# Patient Record
Sex: FEMALE | Race: WHITE
[De-identification: names, ages, dates, MRNs, and addresses within clinical notes are randomized per-mention and may not be internally consistent; named-entity substitution may affect disease eponyms.]

---

## 2018-01-08 ENCOUNTER — HOSPITAL ENCOUNTER (INPATIENT)
Dept: HOSPITAL 45 - C.EDB | Age: 38
LOS: 1 days | Discharge: HOME | DRG: 301 | End: 2018-01-09
Attending: HOSPITALIST | Admitting: HOSPITALIST
Payer: COMMERCIAL

## 2018-01-08 VITALS
WEIGHT: 183.2 LBS | BODY MASS INDEX: 29.44 KG/M2 | HEIGHT: 66 IN | WEIGHT: 183.2 LBS | BODY MASS INDEX: 29.44 KG/M2 | HEIGHT: 66 IN

## 2018-01-08 DIAGNOSIS — I77.74: Primary | ICD-10-CM

## 2018-01-08 LAB
BUN SERPL-MCNC: 12 MG/DL (ref 7–18)
CALCIUM SERPL-MCNC: 9.1 MG/DL (ref 8.5–10.1)
CO2 SERPL-SCNC: 29 MMOL/L (ref 21–32)
CREAT SERPL-MCNC: 0.84 MG/DL (ref 0.6–1.2)
EOSINOPHIL NFR BLD AUTO: 242 K/UL (ref 130–400)
GLUCOSE SERPL-MCNC: 97 MG/DL (ref 70–99)
HCT VFR BLD CALC: 38.8 % (ref 37–47)
HGB BLD-MCNC: 13.2 G/DL (ref 12–16)
MCH RBC QN AUTO: 30.2 PG (ref 25–34)
MCHC RBC AUTO-ENTMCNC: 34 G/DL (ref 32–36)
MCV RBC AUTO: 88.8 FL (ref 80–100)
PMV BLD AUTO: 9.4 FL (ref 7.4–10.4)
POTASSIUM SERPL-SCNC: 3.8 MMOL/L (ref 3.5–5.1)
RED CELL DISTRIBUTION WIDTH CV: 13 % (ref 11.5–14.5)
RED CELL DISTRIBUTION WIDTH SD: 42.2 FL (ref 36.4–46.3)
SODIUM SERPL-SCNC: 135 MMOL/L (ref 136–145)
WBC # BLD AUTO: 8.03 K/UL (ref 4.8–10.8)

## 2018-01-08 NOTE — DIAGNOSTIC IMAGING REPORT
CT ANGIOGRAM OF THE BRAIN COMBO; CT ANGIOGRAM OF THE NECK



CLINICAL HISTORY: Headache.



COMPARISON STUDY:  No priors.



TECHNIQUE: Unenhanced axial CT scan of the brain is performed. Subsequently,

following the IV administration of 94 of Optiray 320, CT angiogram of the head

and neck was performed from the aortic arch to the vertex. Images are reviewed

in the axial, sagittal, and coronal planes. 3-D MIPS images are created and

assessed. IV contrast was administered without complication. All measurements

were calculated based on NASCET criteria.  A dose lowering technique was

utilized adhering to the principles of ALARA.



CT DOSE: 1072.78 mGy.cm



FINDINGS:



Brain parenchyma: The brain parenchyma is normal in appearance. There is no

hemorrhage, mass effect, or evidence of acute territorial ischemia by CT

criteria. There is no evidence of enhancing mass lesion on the angiogram phase

images. The ventricles, sulci, and cisterns are normal in configuration.

Gray-white matter differentiation is preserved. No extra-axial fluid collection

is seen.



Thoracic aorta: Visualized portions of the thoracic aorta are normal in caliber.

The aortic arch demonstrates standard 3-vessel anatomy.



Right carotid arterial system: The right common carotid artery is widely patent,

as are the right internal and external carotid arteries.



Left carotid arterial system: The left common carotid artery is widely patent,

as are the left internal and external carotid arteries.



Vertebral arteries: The vertebral arteries are patent bilaterally and

codominant. There is a focal dissection identified in the right vertebral artery

in the neck at the level of C4-C5. This is best seen on axial image #126. Focal

dissection is also identified in the mid to distal left vertebral artery in the

neck at the level of C3-C4. This is best seen on axial image #142.



Subclavian arteries: Widely patent bilaterally.



Intracranial vasculature: There is fetal origin of the left posterior cerebral

artery. There is a large right posterior communicating artery. The internal

carotid arteries are patent at the skull base, as are the anterior and middle

cerebral arteries bilaterally. The vertebrobasilar system and posterior cerebral

arteries are widely patent. The vertebral arteries are codominant. There is no

aneurysm, high-grade stenosis, or focal vessel cut off seen throughout the

intracranial circulation.



Jugular veins: Widely patent bilaterally.



Dural sinuses: Patent.



Lung apices: Partially visualized upper lobe lung parenchyma appears clear.



Soft tissues: The visualized pharyngeal soft tissues are normal in appearance

noting angiographic phase technique. The oropharyngeal airway appears widely

patent. The salivary and thyroid glands are normal in appearance. No cervical

lymphadenopathy is seen.



Skeletal structures: The calvarium appears intact. The cervical spine is within

normal limits.



Sinuses and mastoids: Trace mucosal thickening is seen in the maxillary antra.

The remaining paranasal sinuses are clear. The mastoid air cells are well

pneumatized.





IMPRESSION:



1. There is no hemorrhage, mass effect, or evidence of acute territorial

ischemia by CT criteria. 



2. Unremarkable CT angiogram of the brain.



3. There are focal dissections identified within both vertebral arteries in the

neck as above. The vessels remain patent.



4. The carotid arteries are widely patent bilaterally.







Electronically signed by:  George Brice M.D.

1/8/2018 10:12 PM



Dictated Date/Time:  1/8/2018 10:00 PM

## 2018-01-09 VITALS
SYSTOLIC BLOOD PRESSURE: 127 MMHG | TEMPERATURE: 98.42 F | OXYGEN SATURATION: 98 % | HEART RATE: 80 BPM | DIASTOLIC BLOOD PRESSURE: 86 MMHG

## 2018-01-09 VITALS
TEMPERATURE: 98.42 F | HEART RATE: 67 BPM | DIASTOLIC BLOOD PRESSURE: 87 MMHG | SYSTOLIC BLOOD PRESSURE: 143 MMHG | OXYGEN SATURATION: 96 %

## 2018-01-09 VITALS
TEMPERATURE: 98.42 F | DIASTOLIC BLOOD PRESSURE: 89 MMHG | OXYGEN SATURATION: 97 % | SYSTOLIC BLOOD PRESSURE: 143 MMHG | HEART RATE: 69 BPM

## 2018-01-09 VITALS
OXYGEN SATURATION: 98 % | SYSTOLIC BLOOD PRESSURE: 127 MMHG | DIASTOLIC BLOOD PRESSURE: 86 MMHG | TEMPERATURE: 98.42 F | HEART RATE: 80 BPM

## 2018-01-09 VITALS
SYSTOLIC BLOOD PRESSURE: 149 MMHG | HEART RATE: 72 BPM | DIASTOLIC BLOOD PRESSURE: 96 MMHG | TEMPERATURE: 98.06 F | OXYGEN SATURATION: 96 %

## 2018-01-09 VITALS — OXYGEN SATURATION: 96 %

## 2018-01-09 LAB
ALBUMIN SERPL-MCNC: 3.9 GM/DL (ref 3.4–5)
ALP SERPL-CCNC: 68 U/L (ref 45–117)
ALT SERPL-CCNC: 27 U/L (ref 12–78)
AST SERPL-CCNC: 20 U/L (ref 15–37)
BASOPHILS # BLD: 0.03 K/UL (ref 0–0.2)
BASOPHILS NFR BLD: 0.3 %
BUN SERPL-MCNC: 9 MG/DL (ref 7–18)
CALCIUM SERPL-MCNC: 8.6 MG/DL (ref 8.5–10.1)
CO2 SERPL-SCNC: 25 MMOL/L (ref 21–32)
CREAT SERPL-MCNC: 0.58 MG/DL (ref 0.6–1.2)
EOS ABS #: 0.29 K/UL (ref 0–0.5)
EOSINOPHIL NFR BLD AUTO: 228 K/UL (ref 130–400)
GLUCOSE SERPL-MCNC: 92 MG/DL (ref 70–99)
HCT VFR BLD CALC: 35.8 % (ref 37–47)
HGB BLD-MCNC: 12.1 G/DL (ref 12–16)
IG#: 0.02 K/UL (ref 0–0.02)
IMM GRANULOCYTES NFR BLD AUTO: 30.4 %
INR PPP: 0.9 (ref 0.9–1.1)
LYMPHOCYTES # BLD: 2.85 K/UL (ref 1.2–3.4)
MCH RBC QN AUTO: 30 PG (ref 25–34)
MCHC RBC AUTO-ENTMCNC: 33.8 G/DL (ref 32–36)
MCV RBC AUTO: 88.8 FL (ref 80–100)
MONO ABS #: 0.7 K/UL (ref 0.11–0.59)
MONOCYTES NFR BLD: 7.5 %
NEUT ABS #: 5.49 K/UL (ref 1.4–6.5)
NEUTROPHILS # BLD AUTO: 3.1 %
NEUTROPHILS NFR BLD AUTO: 58.5 %
PMV BLD AUTO: 9.4 FL (ref 7.4–10.4)
POTASSIUM SERPL-SCNC: 4 MMOL/L (ref 3.5–5.1)
PROT SERPL-MCNC: 8.2 GM/DL (ref 6.4–8.2)
PTT PATIENT: 28.9 SECONDS (ref 21–31)
PTT PATIENT: 41.3 SECONDS (ref 21–31)
RED CELL DISTRIBUTION WIDTH CV: 13 % (ref 11.5–14.5)
RED CELL DISTRIBUTION WIDTH SD: 41.8 FL (ref 36.4–46.3)
SODIUM SERPL-SCNC: 135 MMOL/L (ref 136–145)
WBC # BLD AUTO: 9.38 K/UL (ref 4.8–10.8)

## 2018-01-09 NOTE — EMERGENCY ROOM VISIT NOTE
History


Report prepared by Akua:  Lee Valero


Under the Supervision of:  Dr. Balaji Ambriz D.O.


First contact with patient:  20:21


Chief Complaint:  HYPERTENSION


Stated Complaint:  NECK PAIN, HEADACHE, HPB





History of Present Illness


The patient is a 37 year old female who presents to the Emergency Room with 

complaints of constant neck pain and a headache beginning 3.5 hours ago. The 

patient states she developed left sided neck pain and then a headache. She 

reports her discomfort did not feel like muscle pain. The patient notes she 

went to [employer] and was told her blood pressure was high. She states her 

baseline blood pressure is low. The patient reports she has been having 

intermittent flu like symptoms and nausea for the past two weeks. She notes she 

tried Sudafed and Mucinex, but they have not helped. The patient denies smoking 

and taking daily medication. She notes she had an IUD placed and her menstrual 

period has been irregular.





   Source of History:  patient


   Onset:  3.5 hours ago


   Position:  head, neck (left sided)


   Quality:  ache (head)


   Timing:  constant


   Associated Symptoms:  + nausea (intermittent for the past two weeks)


Note:


Associated symptoms: HTN, intermittent flu like symptoms for a week


Denies: smoking





Review of Systems


See HPI for pertinent positives & negatives. A total of 10 systems reviewed and 

were otherwise negative.





Past Medical & Surgical


Medical Problems:


(1) Uterine contractions at greater than 20 weeks of gestation


(2) Uterine contractions at greater than 20 weeks of gestation


(3) Vertebral artery dissection








Family History





Cancer


Diabetes mellitus


Heart disease


Hypertension





Social History


Smoking Status:  Never Smoker


Smokeless Tobacco Use:  No


Alcohol Use:  occasionally


Marital Status:  


Housing Status:  lives with family


Occupation Status:  employed





Current/Historical Medications


No Active Prescriptions or Reported Meds





Allergies


Coded Allergies:  


     No Known Allergies (Unverified , 12/1/15)





Physical Exam


Vital Signs











  Date Time  Temp Pulse Resp B/P (MAP) Pulse Ox O2 Delivery O2 Flow Rate FiO2


 


1/8/18 23:59  82 20 159/96 98 Room Air  


 


1/8/18 21:42  72 20 132/90 98 Room Air  


 


1/8/18 20:55  72 18 155/104 100 Room Air  


 


1/8/18 20:08 36.5 83 16 179/107 99 Room Air  











Physical Exam


CONSTITUTIONAL/VITAL SIGNS: Reviewed / noted above.


GENERAL: Non-toxic in appearance. 


INTEGUMENTARY: Warm, dry, and Pink.


HEAD: Normocephalic.


EYES: without scleral icterus or trauma.


ENT/OROPHARYNX: clear and moist.


LYMPHADENOPATHY/NECK: Is supple without lymphadenopathy or meningismus.


RESPIRATORY: Lungs clear and equal.


CARDIOVASCULAR: Regular rate and rhythm.


GI/ABDOMEN: Soft and nontender. No organomegaly or pulsatile mass. No rebound 

or guarding. Normal bowel sounds.


EXTREMITIES: Warm and well perfused.


BACK: No CVA tenderness.


NEUROLOGICAL: Intact without focal deficits. 


PSYCHIATRIC: normal affect.


MUSCULOSKELETAL: Normally developed with good muscle tone.





Medical Decision & Procedures


ER Provider


Diagnostic Interpretation:


CT results as stated below per my review and radiologist interpretation: 





CT ANGIOGRAM OF THE BRAIN COMBO; CT ANGIOGRAM OF THE NECK





CLINICAL HISTORY: Headache.





COMPARISON STUDY:  No priors.





TECHNIQUE: Unenhanced axial CT scan of the brain is performed. Subsequently,


following the IV administration of 94 of Optiray 320, CT angiogram of the head


and neck was performed from the aortic arch to the vertex. Images are reviewed


in the axial, sagittal, and coronal planes. 3-D MIPS images are created and


assessed. IV contrast was administered without complication. All measurements


were calculated based on NASCET criteria.  A dose lowering technique was


utilized adhering to the principles of ALARA.





CT DOSE: 1072.78 mGy.cm





FINDINGS:





Brain parenchyma: The brain parenchyma is normal in appearance. There is no


hemorrhage, mass effect, or evidence of acute territorial ischemia by CT


criteria. There is no evidence of enhancing mass lesion on the angiogram phase


images. The ventricles, sulci, and cisterns are normal in configuration.


Gray-white matter differentiation is preserved. No extra-axial fluid collection


is seen.





Thoracic aorta: Visualized portions of the thoracic aorta are normal in caliber.


The aortic arch demonstrates standard 3-vessel anatomy.





Right carotid arterial system: The right common carotid artery is widely patent,


as are the right internal and external carotid arteries.





Left carotid arterial system: The left common carotid artery is widely patent,


as are the left internal and external carotid arteries.





Vertebral arteries: The vertebral arteries are patent bilaterally and


codominant. There is a focal dissection identified in the right vertebral artery


in the neck at the level of C4-C5. This is best seen on axial image #126. Focal


dissection is also identified in the mid to distal left vertebral artery in the


neck at the level of C3-C4. This is best seen on axial image #142.





Subclavian arteries: Widely patent bilaterally.





Intracranial vasculature: There is fetal origin of the left posterior cerebral


artery. There is a large right posterior communicating artery. The internal


carotid arteries are patent at the skull base, as are the anterior and middle


cerebral arteries bilaterally. The vertebrobasilar system and posterior cerebral


arteries are widely patent. The vertebral arteries are codominant. There is no


aneurysm, high-grade stenosis, or focal vessel cut off seen throughout the


intracranial circulation.





Jugular veins: Widely patent bilaterally.





Dural sinuses: Patent.





Lung apices: Partially visualized upper lobe lung parenchyma appears clear.





Soft tissues: The visualized pharyngeal soft tissues are normal in appearance


noting angiographic phase technique. The oropharyngeal airway appears widely


patent. The salivary and thyroid glands are normal in appearance. No cervical


lymphadenopathy is seen.





Skeletal structures: The calvarium appears intact. The cervical spine is within


normal limits.





Sinuses and mastoids: Trace mucosal thickening is seen in the maxillary antra.


The remaining paranasal sinuses are clear. The mastoid air cells are well


pneumatized.








IMPRESSION:





1. There is no hemorrhage, mass effect, or evidence of acute territorial


ischemia by CT criteria. 





2. Unremarkable CT angiogram of the brain.





3. There are focal dissections identified within both vertebral arteries in the


neck as above. The vessels remain patent.





4. The carotid arteries are widely patent bilaterally.











Electronically signed by:  George Brice M.D.


1/8/2018 10:12 PM





Dictated Date/Time:  1/8/2018 10:00 PM





Laboratory Results


1/8/18 20:45








1/8/18 20:45

















Test


  1/8/18


20:45


 


Red Blood Count


  4.37 M/uL


(4.2-5.4)


 


Mean Corpuscular Volume


  88.8 fL


()


 


Mean Corpuscular Hemoglobin


  30.2 pg


(25-34)


 


Mean Corpuscular Hemoglobin


Concent 34.0 g/dl


(32-36)


 


RDW Standard Deviation


  42.2 fL


(36.4-46.3)


 


RDW Coefficient of Variation


  13.0 %


(11.5-14.5)


 


Mean Platelet Volume


  9.4 fL


(7.4-10.4)


 


Prothrombin Time


  9.7 SECONDS


(9.0-12.0)


 


Prothromb Time International


Ratio 0.9 (0.9-1.1) 


 


 


Activated Partial


Thromboplast Time 28.9 SECONDS


(21.0-31.0)


 


Partial Thromboplastin Ratio 1.1 


 


Anion Gap


  4.0 mmol/L


(3-11)


 


Est Creatinine Clear Calc


Drug Dose 100.4 ml/min 


 


 


Estimated GFR (


American) 102.9 


 


 


Estimated GFR (Non-


American 88.8 


 


 


BUN/Creatinine Ratio 14.7 (10-20) 


 


Calcium Level


  9.1 mg/dl


(8.5-10.1)


 


Magnesium Level


  2.5 mg/dl


(1.8-2.4)


 


Total Bilirubin


  0.8 mg/dl


(0.2-1)


 


Direct Bilirubin


  < 0.1 mg/dl


(0-0.2)


 


Aspartate Amino Transf


(AST/SGOT) 20 U/L (15-37) 


 


 


Alanine Aminotransferase


(ALT/SGPT) 27 U/L (12-78) 


 


 


Alkaline Phosphatase


  68 U/L


()


 


Total Protein


  8.2 gm/dl


(6.4-8.2)


 


Albumin


  3.9 gm/dl


(3.4-5.0)


 


Thyroid Stimulating Hormone


(TSH) 1.680 uIu/ml


(0.300-4.500)





Laboratory results as stated above per my review.





Medications Administered











 Medications


  (Trade)  Dose


 Ordered  Sig/Zach


 Route  Start Time


 Stop Time Status Last Admin


Dose Admin


 


 Ketorolac


 Tromethamine


  (Toradol Inj)  30 mg  NOW  STAT


 IV  1/8/18 20:28


 1/8/18 20:31 DC 1/8/18 20:56


30 MG











ED Course


2020: Previous medical records were reviewed. The patient was evaluated in room 

B04B. A complete history and physical examination was performed.





2028: Ordered Ketorolac Tromethamine 30 mg IV





2330: I discussed the patient's case with Dr. Diaz, Neurology. He said admit 

the patient to medicine, put her on a Heparin drip overnight, and have her get 

an MRI of the brain.





2334: On reevaluation, the patient is resting comfortably. I discussed the 

results and findings with the patient. She verbalized agreement of the 

treatment plan. The patient will be evaluated for further management and care.





2341: I discussed the patient's case with Dr. Tomas, [facility] Hospitalist. 

The patient will be evaluated for further management and care.





2343: Ordered Heparin Sodium/Dextrose 1 ea





Medical Decision


Differential includes: Acute intracranial bleed, trauma, meningitis, 

encephalitis, increased intracranial pressure, mass or mass effect, facial or 

dental infection, temporal arteritis, CVA, TIA, acute hypertensive emergency, 

sinusitis, and carbon monoxide exposure.





This is a 37-year-old female who presents to the ED with a chief complaint of a 

headache and hypertension.  The patient states that her symptoms started around 

5 PM tonight.  She developed a posterior headache and some left-sided posterior 

neck pain.  She states that the neck pain is becoming more generalized in the 

posterior neck and a headache is mostly in the posterior but became generalized 

as well.  She states that she has had a head cold for about a month.  She also 

recently got over a GI bug.  She denies any chest pains, shortness of breath, 

abdominal pains.  She does not have a fever.  Physical exam was unremarkable.  

His no lymphadenopathy.  No photophobia.  No localized tenderness in the 

musculature of the posterior neck.  CBC and PRP are normal.  A CT scan 

angiogram reveals bilateral of the head and neck revealed bilateral vertebral 

artery dissections.  I spoke with Dr. Diaz about this patient.  He recommends 

the patient be admitted and placed on IV heparin and worked up additionally 

with MRI.  The patient was reassessed.  Her blood pressure did improve during 

her ED stay.  She was given IV Toradol for pain and IV fluids.  The patient was 

reassessed and told the results.  She is comfortable.  Her blood pressure was 

around 145 systolic.  She will be seen by the hospitalist for further inpatient 

evaluation and care.  She was ordered IV heparin.





Consults


Time Called:  2095


Consulting Physician:  Dr. Diaz, Neurology


Returned Call:  [phone]


I discussed the patient's case with Dr. Diaz, Neurology. He said admit the 

patient to medicine, put her on a Heparin drip overnight, and have her get an 

MRI of the brain.


Additional Consults:  


   Time Called:  2330


   Consulted Physician:  Anita Carrera Hospitalist


   Returned Call:  [phone]


Additional Comments:


I discussed the patient's case with Anita Carrera Hospitalist. The 

patient will be evaluated for further management and care.





Impression





 Primary Impression:  


 Vertebral artery dissection





Scribe Attestation


The scribe's documentation has been prepared under my direction and personally 

reviewed by me in its entirety. I confirm that the note above accurately 

reflects all work, treatment, procedures, and medical decision making performed 

by me.





Departure Information


Dispostion


Being Evaluated By Hospitalist





Prescriptions





No Active Prescriptions or Reported Meds





Referrals


Bhupendra Huff D.O. (PCP)





Patient Instructions


My [facility]

## 2018-01-09 NOTE — DIAGNOSTIC IMAGING REPORT
Brain MRI WITH AND WITHOUT CONTRAST



HISTORY:      headache



TECHNIQUE: Multiplanar multisequence MRI of the brain was performed both before

and after the intravenous administration of contrast.



COMPARISON STUDY:  None.



FINDINGS: There are no areas of restricted diffusion to suggest acute

infarction. The midline structures are intact. The paranasal sinuses are clear.

The mastoid air cells are clear. The ventricles and sulci are within normal

limits for age. There is no mass, hematoma, midline shift. The major vascular

flow-voids at the skull base are well maintained. Postcontrast sequences show no

areas of abnormal enhancement. There are few punctate foci of T2 hyperintensity

within the subcortical white matter of the supratentorial brain.



IMPRESSION:  

No acute intracranial abnormality. A few punctate nonspecific T2 hyperintense

foci within the subcortical white matter of the supratentorial brain. This is

nonspecific could be due to migraines.







Electronically signed by:  Greg Brown M.D.

1/9/2018 7:17 AM



Dictated Date/Time:  1/9/2018 7:09 AM

## 2018-01-09 NOTE — DISCHARGE INSTRUCTIONS
Discharge Instructions


Date of Service


Jan 9, 2018.





Admission


Reason for Admission:  Vertebral Artery Dissection





Discharge


Discharge Diagnosis / Problem:  VERTEBRAL ARTERY DISSECTION





Discharge Goals


Goal(s):  Improve function, Diagnostic testing, Therapeutic intervention





Activity Recommendations


Activity Limitations:  resume your previous activity





.





Instructions / Follow-Up


Instructions / Follow-Up


HOSPITAL FOLLOW UP : 1/12/2018 2:00 PM Dagoberto Hinojosa III, MD [facility]





YOU ARE ASKED TO TAKE BLOOD THINNERS /THROMBOLYTICS-Eliquis 5 MG TWICE DAILY 

FOR 3 MONTHS 


ASPIRIN 81 MG DAILY ( TAKE WITH FULL STOMACH )  





STOP TAKING Eliquis /APIXABAN AFTER 3 MONTHS -THEN CONTINUE TO TAKE ASPIRIN LOW 

DOSE INDEFINITELY 





PLEASE NOTIFY YOUR FAMILY PHYSICIAN IN ANY EVENT OF DARK STOOL( SIGN OF 

BLEEDING IN STOMACH )  , EXCESSIVE NOSE BLEED 


NEED TO STOP TAKING BOTH ASPIRIN AND ELIQUIS WITH ANY EVIDENCE OF BLEEDING 





NEUROLOGY FOLLOW UP WITH DR SANCHEZ IN 2-3 WEEKS ,  PLEASE CALL OFFICE FOR 

APPOINTMENT 





NEUROVASCULAR FOLLOW UP IN [facility] -PLEASE HAVE 

A REFERRAL THROUGH DR HINOJOSA'S OFFICE 





REPEAT CT ANGIOGRAM OF NECK /OR MRA OF BRAIN IN 3-6 MONTHS TO ASSESS FOR 

INTERVAL HEALING





Current Hospital Diet


Patient's current hospital diet: AHA Diet (Heart Healthy)





Discharge Diet


Recommended Diet:  Regular Diet





Pending Studies


Studies pending at discharge:  no





Medical Emergencies








.


Who to Call and When:





Medical Emergencies:  If at any time you feel your situation is an emergency, 

please call 911 immediately.





.





Non-Emergent Contact


Non-Emergency issues call your:  Primary Care Provider





.


.








"Provider Documentation" section prepared by Solange Aguiar.








.





VTE Core Measure


Inpt VTE Proph given/why not?:  Other Anticoagulation (IV HEPARIN )

## 2018-01-09 NOTE — DISCHARGE SUMMARY
Discharge Summary


Date of Service


Jan 9, 2018.





Discharge Summary


Admission Date:


Jan 9, 2018 at 00:00


Discharge Date:  Jan 9, 2018


Discharge Disposition:  Home


Principal Diagnosis:


VERTEBRAL ARTERY DISSECTION


Procedures:


CTA OF BRAIN /NECK  : 





1. There is no hemorrhage, mass effect, or evidence of acute territorial


ischemia by CT criteria. 





2. Unremarkable CT angiogram of the brain.





3. There are focal dissections identified within both vertebral arteries in the 

neck . The vessels remain patent.





4. The carotid arteries are widely patent bilaterally.








MRA OF BRAIN : 





IMPRESSION:  


No acute intracranial abnormality. A few punctate nonspecific T2 hyperintense


foci within the subcortical white matter of the supratentorial brain. This is


nonspecific could be due to migraines.


Consultations:


NEUROLOGY DR DIAZ





Medication Reconciliation


New Medications:  


Apixaban (Eliquis) 2.5 Mg Tab


5 MG PO BID for 30 Days, #120 TAB 3 Refills





Aspirin (Aspirin EC Low Dose) 81 Mg Ectab


1 TAB PO DAILY for 30 Days, #30 TABS 9 Refills











Referrals At Discharge


Follow up Referrals:  


Neurologist Referral - Within 2 Weeks with Alex Diaz M.D.


Physician Referral - 01/12/18 with Dagoberto Hawkins III, M.D.








Admission Information


HPI (per Admitting provider):


DATE OF ADMISSION:  01/09/2018


 


PRIMARY CARE PHYSICIAN:  Bhupendra Huff DO.


 


CHIEF COMPLAINT:  Headache.


 


HISTORY OF PRESENT ILLNESS:  History obtained from patient records.  The


patient seen on 01/09/2018.  No significant medical history.  The patient was


driving yesterday when she noted achy left neck, left headache symptoms.  The


patient is a right-handed female.  Her handwriting was a little floppy.  No


chest pain, no shortness of breath, vision in both eyes were little blurry as


per patient.  The patient does not recall trauma.  Some of relieve pain with


patient's  massaging her.  The patient seen at urgent care center,


referred to the Emergency Room.  A CT angio of the brain did not show any


hemorrhage, mass effect.  Focal dissections identified in both vertebral


arteries.  IV heparin started in the ER as per neurology recommendations.  ER


provider discussed with Dr. Diaz.


 


MEDICAL HISTORY:  As above.


 


SURGERIES:  Dental surgery, D&C, denies any other gynecological procedures.


 


HOME MEDICATIONS:  None.


 


ALLERGIES:  No known drug allergies.


 


FAMILY HISTORY:  Diabetes, heart disease, thyroid problems.


 


PERSONAL AND SOCIAL HISTORY:  Nonsmoker, no chronic intake of alcoholic


beverages.  She is a schoolteacher.


 


REVIEW OF SYSTEMS:  As per HPI.  All 10 systems reviewed.  All other ROS


negative.


Physical Exam (per Admitting):


PHYSICAL EXAMINATION:


VITAL SIGNS:  Blood pressure noted to be 130/90, pulse rate 72, RR 20,


temperature 36.7, sats 98 on room air.


GENERAL:  Noted to be pleasant, no respiratory distress.


SKIN:  Normal color.  Warm.


HEENT:  Pink palpebral conjunctiva.   Moist mucosa.


NECK:  Supple.  No tenderness.


CHEST:  Clear to auscultate.  No tenderness.


HEART:  Regular rate and rhythm, no murmur.


ABDOMEN:  Soft, nontender.


EXTREMITIES:  No edema, no tenderness.


NEUROLOGIC:  Coherent.  No gross focality.





Hospital Course





headache has resolved, had left sided neck pain earlier this morning 5/10 , 

resolved after taking PO Toradol 


feels fine , eager to be discharged home 


IV heparin D/viry , started on PO Eliquis 


evaluated by Neurology Dr Diaz this morning 


stable to be discharged home today 





PHYSICAL EXAM : 


GEN : young female , no sign of distress 


HEENT ; sclera non icteric, PERRLA /EOMI 


LUNGS ; clear to auscultate , no wheeze or rales 


HEART : regular S1/S2


ABDOMEN : soft, non tender, no organomegaly , bowel sound active 


EXTREMITIES : no rash or deformities 


NEURO: AAO x3, no focal neurological deficit 





A/P : 


SPONTANEOUS VERTEBRAL ARTERY DISSECTION : 


-presented with sudden onset of neck pain while driving 


no hx of trauma or whiplash injury 





CTA of neck shows bilateral focal vertebral artery dissection: 


There is a focal dissection identified in the right vertebral artery


in the neck at the level of C4-C5. Focal


dissection is also identified in the mid to distal left vertebral artery in the


neck at the level of C3-C4.





MRA of brain -no evidence of CVA , finding as above 





pt started empirically with IV heparin  


headache as resolved, neck pain has improved with NSAID's only 





appreciate input from Neurology -recommends transition to Oral anticoagulants -

either Coumadin or newer Direct oral anticoagulations ( if pt has insurance 

coverage ) 


as pt had no  prior hx /no evidence of fibromuscular dysplasia 





suggest short term anticoagulation for 3-6 months only 


 repeat CTA of neck /MRA of brain in 3-6 months to assess improvement 





recommend out pt Neurovascular follow up at [facility] care -





pt transitioned to PO Eliquis 5 mg BID for 3 months only ( has 100 % insurance 

coverage ) 


added low dose aspirin  





pt is asked to take over the counter -Tylenol /Motrin ( to be taken with food ) 

as needed for headache /neck pain 





pt is  discharged home today 


scheduled to have Hospital follow up with Dr Hawkins on 1/12/18 ( Family 

Physician ) 





Follow up with Neurology Dr Diaz in 3-4 weeks 





pt is asked to have referral to Vascular surgery at [facility] for future 

follow up


Total time spent on discharge = 45 mins 


This includes examination of the patient, discharge planning, medication 

reconciliation, and communication with other providers.





Discharge Instructions


Discharge Instructions


Date of Service


Jan 9, 2018.





Admission


Reason for Admission:  Vertebral Artery Dissection





Discharge


Discharge Diagnosis / Problem:  VERTEBRAL ARTERY DISSECTION





Discharge Goals


Goal(s):  Improve function, Diagnostic testing, Therapeutic intervention





Activity Recommendations


Activity Limitations:  resume your previous activity





.





Instructions / Follow-Up


Instructions / Follow-Up


HOSPITAL FOLLOW UP : 1/12/2018 2:00 PM Dagoberto Hawkins III, MD [facility]





YOU ARE ASKED TO TAKE BLOOD THINNERS /THROMBOLYTICS-Eliquis 5 MG TWICE DAILY 

FOR 3 MONTHS 


ASPIRIN 81 MG DAILY ( TAKE WITH FULL STOMACH )  





STOP TAKING Eliquis /APIXABAN AFTER 3 MONTHS -THEN CONTINUE TO TAKE ASPIRIN LOW 

DOSE INDEFINITELY 





PLEASE NOTIFY YOUR FAMILY PHYSICIAN IN ANY EVENT OF DARK STOOL( SIGN OF 

BLEEDING IN STOMACH )  , EXCESSIVE NOSE BLEED 


NEED TO STOP TAKING BOTH ASPIRIN AND ELIQUIS WITH ANY EVIDENCE OF BLEEDING 





NEUROLOGY FOLLOW UP WITH DR DIAZ IN 2-3 WEEKS ,  PLEASE CALL OFFICE FOR 

APPOINTMENT 





NEUROVASCULAR FOLLOW UP IN [facility] -PLEASE HAVE 

A REFERRAL THROUGH DR HAWKINS'S OFFICE 





REPEAT CT ANGIOGRAM OF NECK /OR MRA OF BRAIN IN 3-6 MONTHS TO ASSESS FOR 

INTERVAL HEALING





Current Hospital Diet


Patient's current hospital diet: AHA Diet (Heart Healthy)





Discharge Diet


Recommended Diet:  Regular Diet





Pending Studies


Studies pending at discharge:  no





Medical Emergencies








.


Who to Call and When:





Medical Emergencies:  If at any time you feel your situation is an emergency, 

please call 911 immediately.





.





Non-Emergent Contact


Non-Emergency issues call your:  Primary Care Provider





.


.








"Provider Documentation" section prepared by Solange Aguiar.








.





VTE Core Measure


Inpt VTE Proph given/why not?:  Other Anticoagulation (IV HEPARIN )





Additional Copies To


Alex Diaz M.D. Piatt, John E., III, M.D.

## 2018-01-09 NOTE — HISTORY & PHYSICAL EXAMINATION
DATE OF ADMISSION:  01/09/2018

 

PRIMARY CARE PHYSICIAN:  Bhupendra Huff DO.

 

CHIEF COMPLAINT:  Headache.

 

HISTORY OF PRESENT ILLNESS: 



History obtained from patient and records.  



No significant medical history.  



Patient was driving yesterday when she noted achy left neck, left headache 
symptoms.  

Her handwriting was a little sloppy yesterday. 

No chest pain, no shortness of breath.

Vision in both eyes were little blurry as per patient.  

Patient does not recall trauma.  

Some of pain relief w/  massaging sore neck spot.



Patient seen at urgent care center.

Emergency Room evaluation recommended. 



CT angio of the brain did not show any hemorrhage, mass effect.  

Focal dissections identified in both vertebral arteries.  



IV heparin started in the ER as per Neurology recommendations.  



 

MEDICAL HISTORY:  As above.

 

SURGERIES:  Dental surgery, D&C, denies any other gynecological procedures.

 

HOME MEDICATIONS:  None.

 

ALLERGIES:  No known drug allergies.

 

FAMILY HISTORY:  Diabetes, heart disease, thyroid problems.

 

PERSONAL AND SOCIAL HISTORY:  Nonsmoker, no chronic intake of alcoholic

beverages.  She is a schoolteacher.

 

REVIEW OF SYSTEMS:  As per HPI.  All 10 systems reviewed.  All other ROS

negative.

 

PHYSICAL EXAMINATION:

VITAL SIGNS:  Blood pressure noted to be 130/90, pulse rate 72, RR 20,

temperature 36.7, sats 98 on room air.

GENERAL:  Noted to be pleasant, no respiratory distress.

SKIN:  Normal color.  Warm.

HEENT:  Pink palpebral conjunctiva.  Eye balls somewhat protuberant (chronic)

Moist buccal mucosa.

NECK:  Supple.  No tenderness.

CHEST:  CTA.  No tenderness.

HEART:  Regular rate and rhythm, no murmur.

ABDOMEN:  Soft, nontender.

EXTREMITIES:  No edema, no tenderness.  No gross deformities

NEUROLOGIC:  Coherent.  No gross focality.

 

LABORATORY DATA:  Hemoglobin was noted to be 13.2, hematocrit 38, white cell

count 8.8, platelets 242.  Sodium noted to be 135, potassium 3.8, chloride

102, CO2 29, BUN 12, creatinine 0.8, glucose noted to be 97.  



CTA as above.

 

ASSESSMENT:  



Cervical (vertrebral) artery dissection



 

PLAN:  

PCU

IV heparin, MRI of brain as per Neurology recommendations. 

Further management as per Neurology.

(ER provider already in touch with Dr. Diaz.)  

DVT prophylaxis, Heparin.  

Full code.

 

 

 

MTDD

## 2018-01-09 NOTE — NEUROLOGY CONSULTATION
Neurology Consultation


Date of Consultation:


Jan 9, 2018.


Attending Physician:


Solange Aguiar M.D.


Primary Care Physician:


Bhupendra Huff D.O.


Reason for Consultation:


Vertebral artery dissection


History of Present Illness


Source:  patient, hospital records


The patient is a 37-year-old female who presented with a complaint of neck pain 

and headache that began suddenly while driving her car yesterday. The neck pain 

was primarily left-sided and did not radiate into the limbs. [facility] and found to have an elevated blood pressure. She 

presented to the hospital for further assessment. She had been complaining of a 

recent mild flulike illness for the proceeding one to 2 weeks with perhaps some 

minimal coughing. No vomiting or retching. The patient reports that her neck 

pain and headache have resolved. She denies experiencing any vision loss, 

diplopia, change in speech, weakness, or sensory loss in the limbs. This patient

's past medical history is generally unremarkable. No known history of 

connective tissue disease, fibromuscular dysplasia, previous arterial dissection

, or aneurysm. She has not had any chiropractic manipulation. She denies any 

significant head or neck trauma in the previous 6 months. A CT of the head 

completed at the time of presentation was unremarkable. No evidence of 

hemorrhage. CT angiography of the head and neck was also completed. I reviewed 

the images as well as the radiologist's interpretation of this test. There is a 

focal dissection of the right vertebral artery at the C4-5 level. There is a 

focal dissection of the mid left vertebral artery at the C3-4 level. No other 

vascular abnormalities appreciated. I discussed this patient's case with both 

the emergency department attending as well as the admitting physician last 

night. Her blood pressure is modestly elevated but seems to be improving. A 

heparin drip has been started. A brain MRI has been completed as well. I 

reviewed the images as well as radiologist's interpretation of this test. No 

significant abnormalities. No evidence of acute or subacute infarct. No 

evidence of hemorrhage.





Family History


Patient denies a family history of arterial dissection, fibromuscular dysplasia

, or connective tissue disease in any immediate family member.





Social History


Smoking Status:  Never smoker


Smokeless Tobacco Use:  No


Marital Status:  


Housing Status:  lives with family


Occupation Status:  employed





Allergies


Coded Allergies:  


     No Known Allergies (Unverified , 12/1/15)





Current Inpatient Medications





Current Inpatient Medications








 Medications


  (Trade)  Dose


 Ordered  Sig/Zach


 Route  Start Time


 Stop Time Status Last Admin


Dose Admin


 


 Ioversol


  (Optiray 320)  100 ml  UD  PRN


 IV  1/8/18 20:45


 1/12/18 20:44   


 


 


 Prochlorperazine


 Edisylate 5 mg/


 Syringe  5 ml @ 5


 mls/min  Q6H  PRN


 IV  1/9/18 01:15


 2/8/18 01:14   


 


 


 Tramadol HCl


  (Ultram Tab)  25 mg  Q6H  PRN


 PO  1/9/18 01:15


 2/8/18 01:14   


 


 


 Morphine Sulfate


  (MoRPHine


 SULFATE INJ)  4 mg  Q3H  PRN


 IV  1/9/18 01:15


 1/23/18 01:14   


 


 


 Lorazepam


  (Ativan Inj)  0.5 mg  Q4H  PRN


 IV  1/9/18 01:15


 2/8/18 01:14   


 


 


 Heparin Sodium/


 Dextrose  500 ml @ 


 28 mls/hr  [id number] PRN


 IV  1/9/18 01:30


 2/8/18 01:29  1/9/18 07:15


28 MLS/HR


 


 Acetaminophen


  (Tylenol Tab)  650 mg  Q4H  PRN


 PO  1/9/18 03:30


 2/8/18 03:29   


 


 


 Nitroglycerin


  (Nitrostat Tab)  0.4 mg  UD  PRN


 SL  1/9/18 03:30


 2/8/18 03:29   


 


 


 Potassium


 Chloride/Sodium


 Chloride  1,000 ml @ 


 75 mls/hr  [id number] ONCE


 IV  1/9/18 03:30


 1/9/18 16:49  1/9/18 03:55


75 MLS/HR


 


 Gadobutrol


  (Gadavist)  8.4 mmol  UD  PRN


 IV  1/9/18 03:00


 1/13/18 02:59   


 











Review of Systems


Constitutional: No fever or chills


Eyes: No vision loss or diplopia


ENT: No vertigo or hearing loss


Cardiovascular: No chest pain or palpitations


Respiratory: No coughing wheezing or shortness of breath


Gastrointestinal: No nausea or vomiting or diarrhea


Neurological: As per history of present illness





A full 10 point review of systems was obtained from this patient with pertinent 

positives and negatives as described in the history and otherwise listed above. 

All remaining systems reviewed and are negative.





Physical Exam


Vital Signs (Past 24 Hrs):











  Date Time  Temp Pulse Resp B/P (MAP) Pulse Ox O2 Delivery O2 Flow Rate FiO2


 


1/9/18 07:42 36.9 69 18 143/89 (107) 97 Room Air  


 


1/9/18 04:00     96 Room Air  


 


1/9/18 03:47 36.9 67 17 143/87 (105) 96 Room Air  


 


1/9/18 00:50 36.7 72 22 149/96 96 Room Air  


 


1/9/18 00:37  73 20 145/101 98 Room Air  


 


1/9/18 00:02  80      


 


1/8/18 23:59  82 20 159/96 98 Room Air  


 


1/8/18 21:42  72 20 132/90 98 Room Air  


 


1/8/18 20:55  72 18 155/104 100 Room Air  


 


1/8/18 20:08 36.5 83 16 179/107 99 Room Air  








The patient is a well-developed, well-nourished middle-aged female. She is 

sitting up comfortably in bed. The patient is alert and fully oriented. Recent 

and remote memory intact. Attention and concentration normal. Patient exhibits 

a normal spontaneous speech pattern as well as an age-appropriate fund of 

knowledge. Visual fields full to confrontation. Visual acuity normal. Pupils 

equal round reactive to light and accommodation. Eye movements normal. Facial 

sensation intact. There is no facial droop or facial weakness. Hearing intact 

bilaterally. Palate elevates to midline. Shoulder shrug strength intact 

bilaterally. Tongue protrudes to midline. Sensation intact to light touch, 

temperature, vibration, and proprioception in all 4 limbs. Deep tendon reflexes 

are intact and symmetrical for the arms and legs bilaterally. Plantar responses 

downgoing bilaterally. There is no dysdiadochokinesia or dysmetria with finger 

to nose or heel to shin bilaterally. Ophthalmoscopic examination reveals normal-

appearing optic nerves and posterior elements. No papilledema or hemorrhages. 

Carotid pulses normal to auscultation bilaterally. Gait and station normal. 

Muscle strength and tone normal for the arms and legs bilaterally. No atrophy. 

No abnormal movements observed.





Laboratory Results


Past 24 Hours:


1/9/18 06:00








Red Blood Count 4.03, Mean Corpuscular Volume 88.8, Mean Corpuscular Hemoglobin 

30.0, Mean Corpuscular Hemoglobin Concent 33.8, Mean Platelet Volume 9.4, 

Neutrophils (%) (Auto) 58.5, Lymphocytes (%) (Auto) 30.4, Monocytes (%) (Auto) 

7.5, Eosinophils (%) (Auto) 3.1, Basophils (%) (Auto) 0.3, Neutrophils # (Auto) 

5.49, Lymphocytes # (Auto) 2.85, Monocytes # (Auto) 0.70, Eosinophils # (Auto) 

0.29, Basophils # (Auto) 0.03





1/9/18 06:00

















Test


  1/8/18


20:45 1/9/18


00:40 1/9/18


06:00


 


Prothrombin Time


  9.7 SECONDS


(9.0-12.0) 


  


 


 


Prothromb Time International


Ratio 0.9 (0.9-1.1) 


  


  


 


 


Magnesium Level


  2.5 mg/dl


(1.8-2.4) 


  


 


 


Total Bilirubin


  0.8 mg/dl


(0.2-1) 


  


 


 


Direct Bilirubin


  < 0.1 mg/dl


(0-0.2) 


  


 


 


Aspartate Amino Transf


(AST/SGOT) 20 U/L (15-37) 


  


  


 


 


Alanine Aminotransferase


(ALT/SGPT) 27 U/L (12-78) 


  


  


 


 


Alkaline Phosphatase


  68 U/L


() 


  


 


 


Total Protein


  8.2 gm/dl


(6.4-8.2) 


  


 


 


Albumin


  3.9 gm/dl


(3.4-5.0) 


  


 


 


Thyroid Stimulating Hormone


(TSH) 1.680 uIu/ml


(0.300-4.500) 


  


 


 


Urine Color  YELLOW  


 


Urine Appearance  CLOUDY (CLEAR)  


 


Urine pH  7.0 (4.5-7.5)  


 


Urine Specific Gravity


  


  > 1.045


(1.000-1.030) 


 


 


Urine Protein  NEG (NEG)  


 


Urine Glucose (UA)  NEG (NEG)  


 


Urine Ketones  NEG (NEG)  


 


Urine Occult Blood  NEG (NEG)  


 


Urine Nitrite  NEG (NEG)  


 


Urine Bilirubin  NEG (NEG)  


 


Urine Urobilinogen  NEG (NEG)  


 


Urine Leukocyte Esterase  NEG (NEG)  


 


Urine WBC (Auto)  1-5 /hpf (0-5)  


 


Urine RBC (Auto)  0-4 /hpf (0-4)  


 


Urine Hyaline Casts (Auto)  1-5 /lpf (0-5)  


 


Urine Epithelial Cells (Auto)


  


  20-30 /lpf


(0-5) 


 


 


Urine Bacteria (Auto)  1+ (NEG)  


 


Urine Pregnancy Test  NEG (NEG)  


 


White Blood Count


  


  


  9.38 K/uL


(4.8-10.8)


 


Red Blood Count


  


  


  4.03 M/uL


(4.2-5.4)


 


Hemoglobin


  


  


  12.1 g/dL


(12.0-16.0)


 


Hematocrit   35.8 % (37-47) 


 


Mean Corpuscular Volume


  


  


  88.8 fL


()


 


Mean Corpuscular Hemoglobin


  


  


  30.0 pg


(25-34)


 


Mean Corpuscular Hemoglobin


Concent 


  


  33.8 g/dl


(32-36)


 


Platelet Count


  


  


  228 K/uL


(130-400)


 


Mean Platelet Volume


  


  


  9.4 fL


(7.4-10.4)


 


Neutrophils (%) (Auto)   58.5 % 


 


Lymphocytes (%) (Auto)   30.4 % 


 


Monocytes (%) (Auto)   7.5 % 


 


Eosinophils (%) (Auto)   3.1 % 


 


Basophils (%) (Auto)   0.3 % 


 


Neutrophils # (Auto)


  


  


  5.49 K/uL


(1.4-6.5)


 


Lymphocytes # (Auto)


  


  


  2.85 K/uL


(1.2-3.4)


 


Monocytes # (Auto)


  


  


  0.70 K/uL


(0.11-0.59)


 


Eosinophils # (Auto)


  


  


  0.29 K/uL


(0-0.5)


 


Basophils # (Auto)


  


  


  0.03 K/uL


(0-0.2)


 


RDW Standard Deviation


  


  


  41.8 fL


(36.4-46.3)


 


RDW Coefficient of Variation


  


  


  13.0 %


(11.5-14.5)


 


Immature Granulocyte % (Auto)   0.2 % 


 


Immature Granulocyte # (Auto)


  


  


  0.02 K/uL


(0.00-0.02)


 


Activated Partial


Thromboplast Time 


  


  41.3 SECONDS


(21.0-31.0)


 


Partial Thromboplastin Ratio   1.6 


 


Anion Gap


  


  


  5.0 mmol/L


(3-11)


 


Est Creatinine Clear Calc


Drug Dose 


  


  144.2 ml/min 


 


 


Estimated GFR (


American) 


  


  136.5 


 


 


Estimated GFR (Non-


American 


  


  117.8 


 


 


BUN/Creatinine Ratio   15.4 (10-20) 


 


Calcium Level


  


  


  8.6 mg/dl


(8.5-10.1)











Impression


Bilateral, focal, vertebral artery dissections presenting with neck pain and 

headache, now resolved. Patient did not present with TIA or strokelike 

symptoms. She has an intact neurological examination, and normal brain MRI. 

Etiology of the vertebral artery dissections is undetermined. Patient did 

complain of a flulike illness which occurred 1-2 weeks prior to onset of her 

neck pain and headache. Inflammation from this illness may have been a 

contributing factor as well as associated mild coughing. No known history of 

recent trauma or chiropractic manipulation. No known history of connective 

tissue disease, fibromuscular dysplasia, or other potential associated medical 

factor.





Plan


Guidelines suggest using either antiplatelet medication or anticoagulants to 

treat either carotid or vertebral artery dissection. Given that this patient 

has bilateral vertebral artery dissections and does not have any evidence of 

hemorrhage I would recommend treatment with an anticoagulant for the next 3-6 

months. Either warfarin or a newer oral anticoagulant would be appropriate.





An outpatient consultation with a neurovascular specialist either at [facility] or [facility] would also be appropriate. A follow-up CT 

angiogram or MRA will likely be needed in 3-6 months to assess for interval 

healing.





This patient should also follow up with her primary care physician for 

monitoring and possible treatment of hypertension.





She may follow-up with me locally for monitoring of her neurological status.





Case discussed with Dr. Aguiar this morning.





No further recommendations at this time.